# Patient Record
Sex: FEMALE | Race: WHITE | NOT HISPANIC OR LATINO | Employment: FULL TIME | URBAN - METROPOLITAN AREA
[De-identification: names, ages, dates, MRNs, and addresses within clinical notes are randomized per-mention and may not be internally consistent; named-entity substitution may affect disease eponyms.]

---

## 2020-08-12 ENCOUNTER — HOSPITAL ENCOUNTER (OUTPATIENT)
Dept: RADIOLOGY | Facility: MEDICAL CENTER | Age: 45
End: 2020-08-12
Attending: OBSTETRICS & GYNECOLOGY
Payer: COMMERCIAL

## 2020-08-12 DIAGNOSIS — Z12.31 VISIT FOR SCREENING MAMMOGRAM: ICD-10-CM

## 2020-08-12 PROCEDURE — 77067 SCR MAMMO BI INCL CAD: CPT

## 2022-04-06 ENCOUNTER — HOSPITAL ENCOUNTER (OUTPATIENT)
Dept: RADIOLOGY | Facility: MEDICAL CENTER | Age: 47
End: 2022-04-06
Attending: OBSTETRICS & GYNECOLOGY
Payer: COMMERCIAL

## 2022-04-06 DIAGNOSIS — Z12.31 VISIT FOR SCREENING MAMMOGRAM: ICD-10-CM

## 2022-04-06 PROCEDURE — 77063 BREAST TOMOSYNTHESIS BI: CPT

## 2024-03-06 ENCOUNTER — OFFICE VISIT (OUTPATIENT)
Dept: URGENT CARE | Facility: CLINIC | Age: 49
End: 2024-03-06
Payer: COMMERCIAL

## 2024-03-06 ENCOUNTER — APPOINTMENT (OUTPATIENT)
Dept: RADIOLOGY | Facility: IMAGING CENTER | Age: 49
End: 2024-03-06
Payer: COMMERCIAL

## 2024-03-06 VITALS
HEIGHT: 66 IN | DIASTOLIC BLOOD PRESSURE: 80 MMHG | TEMPERATURE: 98.3 F | HEART RATE: 64 BPM | WEIGHT: 180 LBS | BODY MASS INDEX: 28.93 KG/M2 | RESPIRATION RATE: 20 BRPM | OXYGEN SATURATION: 94 % | SYSTOLIC BLOOD PRESSURE: 126 MMHG

## 2024-03-06 DIAGNOSIS — S67.10XA CRUSHING INJURY OF FINGER, INITIAL ENCOUNTER: ICD-10-CM

## 2024-03-06 PROCEDURE — 99203 OFFICE O/P NEW LOW 30 MIN: CPT

## 2024-03-06 PROCEDURE — 73140 X-RAY EXAM OF FINGER(S): CPT | Mod: TC,FY,RT | Performed by: RADIOLOGY

## 2024-03-06 PROCEDURE — 3079F DIAST BP 80-89 MM HG: CPT

## 2024-03-06 PROCEDURE — 3074F SYST BP LT 130 MM HG: CPT

## 2024-03-06 NOTE — PROGRESS NOTES
CHIEF COMPLAINT  Chief Complaint   Patient presents with    Finger Injury     RED AND PURPLE today      Subjective:   Myrna Ritter is a 49 y.o. female who presents to urgent care with complaints of injury to her right ring finger.  Patient reports that she was carrying a very heavy vase and attempted to catch it with it fell.  She reports that her finger was smashed between the bottom of the vase and the floor.  She reports significant swelling and pain is progressively worsened over the course the day.  She denies any numbness or tingling.  She denies any loss sensation.  Patient denies any difficulty moving finger but is concerned about potential fracture.  She denies any other pertinent past medical history.      ROS    PAST MEDICAL HISTORY  Patient Active Problem List    Diagnosis Date Noted    Varicose veins 02/03/2013    Plantar fasciitis of left foot 02/03/2013    Knee pain, bilateral 02/03/2013       SURGICAL HISTORY   has a past surgical history that includes vein stripping.    ALLERGIES  Allergies   Allergen Reactions    Nkda [No Known Drug Allergy]        CURRENT MEDICATIONS  Home Medications       Reviewed by Lyndon Henry'preet (Medical Assistant) on 03/06/24 at 1551  Med List Status: <None>     Medication Last Dose Status   acetaminophen (TYLENOL) 500 MG TABS Taking Active   ibuprofen (MOTRIN) 200 MG TABS Taking Active   Misc Natural Products (GLUCOSAMINE CHOND COMPLEX/MSM) TABS Not Taking Active                    SOCIAL HISTORY  Social History     Tobacco Use    Smoking status: Former    Smokeless tobacco: Never    Tobacco comments:     smoked socially in high school   Substance and Sexual Activity    Alcohol use: Yes    Drug use: Not on file    Sexual activity: Yes     Partners: Male       FAMILY HISTORY  Family History   Problem Relation Age of Onset    Stroke Father          Medications, Allergies, and current problem list reviewed today in Epic.     Objective:     /80    "Pulse 64   Temp 36.8 °C (98.3 °F) (Temporal)   Resp 20   Ht 1.676 m (5' 6\")   Wt 81.6 kg (180 lb)   SpO2 94%     Physical Exam  Vitals reviewed.   Constitutional:       General: She is not in acute distress.     Appearance: Normal appearance. She is not ill-appearing or toxic-appearing.   HENT:      Head: Normocephalic.      Right Ear: Tympanic membrane normal.   Cardiovascular:      Rate and Rhythm: Normal rate and regular rhythm.      Pulses: Normal pulses.      Heart sounds: Normal heart sounds.   Pulmonary:      Effort: Pulmonary effort is normal. No respiratory distress.      Breath sounds: Normal breath sounds.   Musculoskeletal:      Right hand: Swelling and tenderness present. No deformity, lacerations or bony tenderness. Normal range of motion. Normal strength. Normal sensation. There is no disruption of two-point discrimination. Normal capillary refill. Normal pulse.      Cervical back: Normal range of motion. No rigidity or tenderness.      Comments: Significant swelling and bruising to right ring finger.  No signs of subungual hematoma.  Nail is intact.     Lymphadenopathy:      Cervical: No cervical adenopathy.   Skin:     General: Skin is warm.      Capillary Refill: Capillary refill takes less than 2 seconds.   Neurological:      General: No focal deficit present.      Mental Status: She is alert.   Psychiatric:         Mood and Affect: Mood normal.         RADIOLOGY RESULTS   DX-FINGER(S) 2+ RIGHT    Result Date: 3/6/2024  3/6/2024 4:05 PM HISTORY/REASON FOR EXAM:  Pain/Deformity Following Trauma TECHNIQUE/EXAM DESCRIPTION AND NUMBER OF VIEWS: 3 of the right fingers COMPARISON: None. FINDINGS: There is normal bony mineralization.  There is no evidence of fracture, dislocation, or osseous lesion.  There is no evidence of soft tissue injury.     1.  No radiographic evidence of acute injury.          Assessment/Plan:     Diagnosis and associated orders:     1. Crushing injury of finger, initial " encounter  DX-FINGER(S) 2+ RIGHT         Comments/MDM:     Upon physical exam patient is alert no apparent signs of distress.  Vital signs are stable in clinic.  X-ray reveals no evidence of fracture or dislocation to finger.  Discussed x-ray findings with patient today in clinic.  Advised patient to take Tylenol Motrin for alleviation discomfort.  Ice as needed.  Red flag signs and symptoms discussed.  Instructed to return to ER or urgent care if symptoms worsen or fail to improve.         Differential diagnosis, natural history, supportive care, and indications for immediate follow-up discussed.    Advised the patient to follow-up with the primary care physician for recheck, reevaluation, and consideration of further management.    Please note that this dictation was created using voice recognition software. I have made a reasonable attempt to correct obvious errors, but I expect that there are errors of grammar and possibly content that I did not discover before finalizing the note.    This note was electronically signed by PAULETTE Osborne

## 2024-03-15 ENCOUNTER — HOSPITAL ENCOUNTER (OUTPATIENT)
Dept: RADIOLOGY | Facility: MEDICAL CENTER | Age: 49
End: 2024-03-15
Attending: OBSTETRICS & GYNECOLOGY
Payer: COMMERCIAL

## 2024-03-15 DIAGNOSIS — Z12.31 VISIT FOR SCREENING MAMMOGRAM: ICD-10-CM

## 2024-03-15 PROCEDURE — 77067 SCR MAMMO BI INCL CAD: CPT

## 2024-03-21 ENCOUNTER — HOSPITAL ENCOUNTER (OUTPATIENT)
Dept: RADIOLOGY | Facility: MEDICAL CENTER | Age: 49
End: 2024-03-21
Attending: STUDENT IN AN ORGANIZED HEALTH CARE EDUCATION/TRAINING PROGRAM
Payer: COMMERCIAL

## 2024-03-21 DIAGNOSIS — M72.2 PLANTAR FASCIITIS OF LEFT FOOT: ICD-10-CM

## 2024-03-21 DIAGNOSIS — R29.898 HEAVY SENSATION OF LOWER EXTREMITY: ICD-10-CM

## 2024-03-21 PROCEDURE — 73650 X-RAY EXAM OF HEEL: CPT | Mod: LT

## 2024-03-21 PROCEDURE — 93922 UPR/L XTREMITY ART 2 LEVELS: CPT

## 2024-11-26 ENCOUNTER — APPOINTMENT (OUTPATIENT)
Dept: RADIOLOGY | Facility: MEDICAL CENTER | Age: 49
End: 2024-11-26
Attending: STUDENT IN AN ORGANIZED HEALTH CARE EDUCATION/TRAINING PROGRAM
Payer: COMMERCIAL

## 2024-11-26 DIAGNOSIS — G89.29 CHRONIC IDIOPATHIC ANAL PAIN: ICD-10-CM

## 2024-11-26 DIAGNOSIS — M25.562 ARTHRALGIA OF LEFT LOWER LEG: ICD-10-CM

## 2024-11-26 DIAGNOSIS — K62.89 CHRONIC IDIOPATHIC ANAL PAIN: ICD-10-CM

## 2024-11-26 DIAGNOSIS — M25.561 ARTHRALGIA OF RIGHT LOWER LEG: ICD-10-CM

## 2024-11-26 PROCEDURE — 73562 X-RAY EXAM OF KNEE 3: CPT | Mod: RT

## 2024-11-26 PROCEDURE — 73562 X-RAY EXAM OF KNEE 3: CPT | Mod: LT

## 2025-03-27 ENCOUNTER — OFFICE VISIT (OUTPATIENT)
Dept: PHYSICAL MEDICINE AND REHAB | Facility: MEDICAL CENTER | Age: 50
End: 2025-03-27
Payer: COMMERCIAL

## 2025-03-27 VITALS
SYSTOLIC BLOOD PRESSURE: 122 MMHG | OXYGEN SATURATION: 97 % | RESPIRATION RATE: 16 BRPM | BODY MASS INDEX: 25.07 KG/M2 | HEART RATE: 84 BPM | TEMPERATURE: 97 F | HEIGHT: 66 IN | DIASTOLIC BLOOD PRESSURE: 82 MMHG | WEIGHT: 156 LBS

## 2025-03-27 DIAGNOSIS — M79.7 FIBROMYALGIA: ICD-10-CM

## 2025-03-27 DIAGNOSIS — G89.29 CHRONIC PAIN OF BOTH KNEES: ICD-10-CM

## 2025-03-27 DIAGNOSIS — M25.50 POLYARTHRALGIA: ICD-10-CM

## 2025-03-27 DIAGNOSIS — M25.561 CHRONIC PAIN OF BOTH KNEES: ICD-10-CM

## 2025-03-27 DIAGNOSIS — M25.562 CHRONIC PAIN OF BOTH KNEES: ICD-10-CM

## 2025-03-27 RX ORDER — DULOXETIN HYDROCHLORIDE 30 MG/1
30 CAPSULE, DELAYED RELEASE ORAL DAILY
Qty: 30 CAPSULE | Refills: 2 | Status: SHIPPED | OUTPATIENT
Start: 2025-03-27

## 2025-03-27 ASSESSMENT — PATIENT HEALTH QUESTIONNAIRE - PHQ9
5. POOR APPETITE OR OVEREATING: 1 - SEVERAL DAYS
SUM OF ALL RESPONSES TO PHQ QUESTIONS 1-9: 14
CLINICAL INTERPRETATION OF PHQ2 SCORE: 3

## 2025-03-27 ASSESSMENT — PAIN SCALES - GENERAL: PAINLEVEL_OUTOF10: 7=MODERATE-SEVERE PAIN

## 2025-03-27 NOTE — PROGRESS NOTES
[facility] (Physical Medicine and Rehabilitation)  Sports Medicine& Interventional Spine   New Patient Visit    Patient Name: Myrna Ritter   Patient : 1975  PCP: Marcia Jaramillo M.D.  MRN: [id number]     Date of service: 25    Referring provider: Marcia Jaramillo M.D.    CHIEF COMPLAINT  Chief Complaint   Patient presents with    New Patient     Bilateral Knee Pain         Myrna Ritter is a 50 y.o. very pleasant female  who presents today with Diagnoses of Chronic pain of both knees, Polyarthralgia, and Fibromyalgia were pertinent to this visit.    Verbal consent was obtained for Daniel copilot: Yes      Medical records review:  I reviewed the note from the referring provider Marcia Jaramillo M.D. including the note dated 3/18/25.    Prior Relevant MSK/Interventional Procedures:   Patient has not attempted prior ortho/joint injections.   Patient has not had prior ortho/joint surgery.           HPI:    History of Present Illness  The patient is a 42-year-old female who presents today for initial evaluation of chronic pain throughout multiple joints. She was previously seen by her primary care provider, concern for fibromyalgia. She was referred to our clinic for further evaluation. Previous lab testing did show positive PETRA. She is currently awaiting a rheumatology referral her predominant pain concern is related to bilateral knee pain. Patient reported to be 6-7 out of 10 in severity. However, she does report widespread chronic pain. This has been present for the past 3 years. She notes burning, sharp pain at the anterior aspect of bilateral knees. She also notes aching pain throughout neck, bilateral shoulders, and arms, posterior buttock region, and bilateral feet. She is currently taking Tylenol and a multivitamin for medication. X-ray of bilateral knees dated 2024 showed no evidence of arthritis or effusion no other acute bony abnormality.    She reports experiencing  widespread chronic pain, which she attributes to a COVID-19 infection in 2021. The pain, described as burning and sharp, is localized to the anterior aspect of her bilateral knees, with additional aching pain throughout her neck, bilateral shoulders and arms, posterior buttock region, and bilateral feet. This pain has progressively worsened over the past 3 years, significantly impacting her daily activities such as gardening and housework. She also experiences stiffness and difficulty carrying objects due to pain in her knees, bones, and joints. Despite normal blood work results, her primary care physician suggested increased sleep, reduced workload, and stress management. She also reports high levels of stress due to her demanding job as a . Prolonged sitting exacerbates her knee pain, often reducing her to tears after only 15 to 30 minutes of driving. She has attempted physical therapy for her foot, knee, and shoulder pain, but without noticeable improvement. Overexertion exacerbates her symptoms, leading to weakness and avoidance of certain movements. She has also sought treatment from an acupuncturist, which she believes has increased her energy levels. She is considering IV vitamin and mineral injections at a wellness clinic to boost her energy levels. She has found some relief from lymphatic massages and compression therapy, but these treatments are costly. She is currently taking Tylenol and a multivitamin for medication.    She has also experienced brain fog and fatigue, prompting her physician to consider a diagnosis of fibromyalgia. Her blood work indicated a possible autoimmune condition, and she has developed rashes under her ears and around her neck, leading her physician to suggest lupus as a potential diagnosis.    Supplemental Information  She has been prescribed herbs for bowel regulation and now has daily bowel movements.    SOCIAL HISTORY  She works as a .    FAMILY  HISTORY  Her mother had arthritis.    MEDICATIONS  Tylenol, multivitamin        ROS:   Fever, Chills, Sweats: Denies  Involuntary Weight Loss: Denies  Bowel/bladder issues: denies   See HPI.   All other systems reviewed and negative.       GOALS OF TREATMENT: Symptom Pain relief. improve function.      Psychological testing for pain as depression and pain commonly coexist and need to both be treated.     Opioid Risk Score: No value filed.      Interpretation of Opioid Risk Score   Score 0-3 = Low risk of abuse. Do UDS at least once per year.  Score 4-7 = Moderate risk of abuse. Do UDS 1-4 times per year.  Score 8+ = High risk of abuse. Refer to specialist.    PHQ      3/27/2025    10:00 AM   Depression Screen (PHQ-2/PHQ-9)   PHQ-2 Total Score 3   PHQ-9 Total Score 14       Interpretation of PHQ-9 Total Score   Score Severity   1-4 No Depression   5-9 Mild Depression   10-14 Moderate Depression   15-19 Moderately Severe Depression   20-27 Severe Depression      PMHx:   History reviewed. No pertinent past medical history.    PSHx:   Past Surgical History:   Procedure Laterality Date    MI BREAST REDUCTION      VEIN STRIPPING         Family history   Family History   Problem Relation Age of Onset    Stroke Father        Medications: reviewed on epic.   Outpatient Medications Marked as Taking for the 3/27/25 encounter (Office Visit) with Matias Dunham D.O.   Medication Sig Dispense Refill    DULoxetine (CYMBALTA) 30 MG Cap DR Particles Take 1 Capsule by mouth every day. 30 Capsule 2    ibuprofen (MOTRIN) 200 MG TABS Take 200 mg by mouth every 6 hours as needed.        acetaminophen (TYLENOL) 500 MG TABS Take 500-1,000 mg by mouth every 6 hours as needed.            Allergies:   Allergies   Allergen Reactions    Nkda [No Known Drug Allergy]        Social Hx:   Social History     Socioeconomic History    Marital status:      Spouse name: Not on file    Number of children: Not on file    Years of education: Not on  "file    Highest education level: Not on file   Occupational History    Not on file   Tobacco Use    Smoking status: Former    Smokeless tobacco: Never    Tobacco comments:     smoked socially in high school   Vaping Use    Vaping status: Never Used   Substance and Sexual Activity    Alcohol use: Yes    Drug use: Not on file    Sexual activity: Yes     Partners: Male   Other Topics Concern    Not on file   Social History Narrative    Not on file     Social Drivers of Health     Financial Resource Strain: Not on file   Food Insecurity: Not on file   Transportation Needs: Not on file   Physical Activity: Not on file   Stress: Not on file (11/3/2024)   Social Connections: Not on file   Intimate Partner Violence: Low Risk  (10/9/2021)    Received from [facility] [employer] [facility]    Intimate Partner Violence     Insults You: Not on file     Threatens You: Not on file     Screams at You: Not on file     Physically Hurt: Not on file     Intimate Partner Violence Score: Not on file   Housing Stability: Not on file         EXAMINATION   Vitals: /82 (BP Location: Right arm, Patient Position: Sitting, BP Cuff Size: Adult)   Pulse 84   Temp 36.1 °C (97 °F) (Temporal)   Resp 16   Ht 1.676 m (5' 6\")   Wt 70.8 kg (156 lb)   SpO2 97%   Physical Exam:     Body Habitus: Body mass index is 25.18 kg/m².  Appearance: Well-groomed, well-nourished, not disheveled  Eyes: No scleral icterus to suggest severe liver disease, no proptosis to suggest severe hyperthyroid  ENT -no obvious auditory deficits, no external lesions, moist mucus membranes   Skin -no rashes or lesions noted. No appreciable skin breakdown on exposed skin areas.    Respiratory-  breathing comfortably on room air, no audible wheezing, full sentences  Cardiovascular- No lower extremity edema noted.   Psychiatric- alert and oriented, calm, comfortable, cooperative     Musculoskeletal and Neuro:  Gait and station - normal gait with reciprocal pattern,  no " presence/use of ambulatory device, no arm assistance with sit-to-stand, nonantalgic. no loss of balance during exam.  No change in patient's demeanor with exam.    Grossly normal cranial nerve exam  Coordination grossly intact      Physical Exam  Diffuse muscular tenderness was noted in the bilateral shoulders, back paraspinal region, posterior buttock region, thighs, and calf region.      Thoracic/Lumbar Spine/Sacral Spine/Hips   Inspection: No evidence of atrophy in bilateral lower extremities throughout     ROM: full  active range of motion with flexion, lateral flexion, and rotation bilaterally.   There is full  active range of motion with lumbar extension with pain.    There is no pain with facet loading maneuver (extension rotation) with axial low back pain on the BILATERAL side(s)    Palpation:   POSITIVE for tenderness to palpation to the para-midline region in the lower lumbar levels.  palpation over SI joint: negative bilaterally  palpation in hip or over the gluteus medius tendon insertion: negative bilaterally       HIP  FAIR test negative bilaterally   Range of motion in the RIGHT hip is full  in flexion, extension, abduction, internal rotation, external rotation.  Range of motion in the LEFT hip is full  in flexion, extension, abduction, internal rotation, external rotation.  Patricia negative bilaterally      SI joint tests  Observation patient sits on one buttocks: Negative  SI joint compression negative bilaterally    SI joint distraction negative bilaterally  Thigh thrust test negative bilaterally   PATRICIA test negative bilaterally  Gaenslen test negative bilaterally    Key points for the international standards for neurological classification of spinal cord injury (ISNCSCI) to light touch.   Dermatome R L   L2 2 2   L3 2 2   L4 2 2   L5 2 2   S1 2 2     Motor Exam Lower Extremities  ? Myotome R L   Hip flexion L2 5 5   Knee extension L3 5 5   Ankle dorsiflexion L4 5 5   Toe extension L5 5 5   Ankle  "plantarflexion S1 5 5   Hip Abduction  5 5   Hip Adduction  5 5     Reflexes  Clonus of the ankle negative bilaterally   ? R L   Patella 2+ 2+   Achilles  2+ 2+   Babinski sign negative bilaterally     Bilateral Knee:   Inspection: no gross deformities appreciated, no edema no ecchymoses, neutral alignment, no valgus/varus deformity, no quad atrophy    Palpation: no effusion. Non-tender to palpation over medial / lateral joint line, patella/patellar facet, patellar / quadriceps tendon, tibial tubercle, posterior fossae, pes anserine bursa, fibular head  ROM: extension to 0 degrees, flexion to 120 degrees; no crepitus   Strength : 5/5 in flexion, extension.   Sensation: intact  Special Tests:   A/P Drawer  negative  Lachman's  negative  Piero's negative  Thessaly negative  Varus stress negative  Valgus stress negative   Patellar grind negative       MEDICAL DECISION MAKING    Medical records review: see under HPI section.     DATA    Labs:   Lab Results   Component Value Date/Time    SODIUM 141 05/11/2016 09:20 AM    POTASSIUM 4.1 05/11/2016 09:20 AM    CHLORIDE 106 05/11/2016 09:20 AM    CO2 27 05/11/2016 09:20 AM    ANION 8.0 05/11/2016 09:20 AM    GLUCOSE 95 12/12/2023 08:54 AM    GLUCOSE 91 05/11/2016 09:20 AM    BUN 17 05/11/2016 09:20 AM    CREATININE 0.86 05/11/2016 09:20 AM    CALCIUM 9.8 05/11/2016 09:20 AM    ASTSGOT 16 05/11/2016 09:20 AM    ALTSGPT 15 05/11/2016 09:20 AM    TBILIRUBIN 0.9 05/11/2016 09:20 AM    ALBUMIN 4.8 05/11/2016 09:20 AM    TOTPROTEIN 7.6 05/11/2016 09:20 AM    GLOBULIN 2.8 05/11/2016 09:20 AM    AGRATIO 1.7 05/11/2016 09:20 AM   ]    No results found for: \"PROTHROMBTM\", \"INR\"     Lab Results   Component Value Date/Time    WBC 9.4 05/11/2016 09:20 AM    RBC 4.82 05/11/2016 09:20 AM    HEMOGLOBIN 14.5 05/11/2016 09:20 AM    HEMATOCRIT 44.1 05/11/2016 09:20 AM    MCV 91.5 05/11/2016 09:20 AM    MCH 30.1 05/11/2016 09:20 AM    MCHC 32.9 (L) 05/11/2016 09:20 AM    MPV 10.1 05/11/2016 " "09:20 AM        Lab Results   Component Value Date/Time    HBA1C 5.2 2022 10:07 AM    HBA1C 5.5 2016 09:20 AM        Imaging:   I personally reviewed following images, these are my reads  Results  Laboratory Studies  Positive PETRA.    Imaging  X-ray of bilateral knees showed no evidence of arthritis or effusion, no other acute bony abnormality.        IMAGING radiology reads. I reviewed the following radiology reads                                                                         Results for orders placed in visit on 24    DX-FINGER(S) 2+ RIGHT    Impression  1.  No radiographic evidence of acute injury.        Results for orders placed during the hospital encounter of 24    DX-OS CALCIS (HEEL) 2+ LEFT    Impression  1.  Spurring at the insertion of the achilles tendon and origin of plantar fascia on the calcaneus    2.  No other finding        Results for orders placed in visit on 24    DX-KNEE 3 VIEWS Other - Please Comment    Impression  1. No acute osseous abnormality.                              ASSESSMENT AND PLAN:  Myrna Ritter   : 1975       Diagnoses and all orders for this visit:  1. Chronic pain of both knees        2. Polyarthralgia        3. Fibromyalgia              Assessment & Plan  Fibromyalgia.  Her symptoms align with a diagnosis of fibromyalgia, characterized by diffuse muscle pain, joint pain, fatigue, and cognitive difficulties commonly referred to as \"brain fog.\" The only abnormal laboratory result was a positive PETRA, which is nonspecific. All other tests were within normal limits. The absence of any joint degradation or arthritis on x-rays further supports this diagnosis. A comprehensive treatment plan was discussed, including the potential benefits of duloxetine in managing her symptoms. The importance of physical activity in long-term pain management was emphasized, and she was encouraged to continue with Pilates. The use of compression " therapy was also recommended. A prescription for duloxetine 30 mg once daily was provided, with instructions to start at a low dose and gradually increase if tolerated. The potential side effects of duloxetine were discussed. A referral to behavioral health for cognitive behavioral therapy was also suggested, will defer for now.     Follow-up  The patient will follow up in 6 weeks.      Orders Placed This Encounter    DULoxetine (CYMBALTA) 30 MG Cap DR Particles       -Medications/Modalities: duloxetine as above, consider increase to 60mg if tolerate at follow up  -Limitations: Activity as tolerated     -Therapy (PT/OT/Aquatherapy):Consider therapy and behavioral health referral at follow up  -Diagnostic workup: reviewed today as above  -Interventional program: not indicated at this time  -Referrals: none required at this time      Follow-up: 6 weeks    Patient expressed understanding of the management plan. Patient (and Family Members) was/were encouraged to call if any worries, issues, problems or concerns prior to the next visit     Please note that this dictation was created using voice recognition software. I have made every reasonable attempt to correct obvious errors but there may be errors of grammar and content that I may have overlooked prior to finalization of this note.    Matias Dunham DO  Physical Medicine and Rehabilitation  Sports Medicine and Spine  [facility]           AMELIA Jaramillo M.D.   Marcia Tejada M.D.

## 2025-05-10 ENCOUNTER — NON-PROVIDER VISIT (OUTPATIENT)
Dept: URGENT CARE | Facility: CLINIC | Age: 50
End: 2025-05-10
Payer: COMMERCIAL

## 2025-05-10 ENCOUNTER — OFFICE VISIT (OUTPATIENT)
Dept: URGENT CARE | Facility: CLINIC | Age: 50
End: 2025-05-10
Payer: COMMERCIAL

## 2025-05-10 VITALS
SYSTOLIC BLOOD PRESSURE: 108 MMHG | RESPIRATION RATE: 15 BRPM | DIASTOLIC BLOOD PRESSURE: 70 MMHG | WEIGHT: 142.2 LBS | TEMPERATURE: 98.2 F | BODY MASS INDEX: 22.85 KG/M2 | HEIGHT: 66 IN | OXYGEN SATURATION: 100 % | HEART RATE: 83 BPM

## 2025-05-10 DIAGNOSIS — S06.0X0A CONCUSSION WITHOUT LOSS OF CONSCIOUSNESS, INITIAL ENCOUNTER: ICD-10-CM

## 2025-05-10 DIAGNOSIS — S50.312A ABRASION OF LEFT ELBOW, INITIAL ENCOUNTER: ICD-10-CM
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
## 2025-05-10 DIAGNOSIS — Z02.1 PRE-EMPLOYMENT DRUG SCREENING: ICD-10-CM

## 2025-05-10 LAB
AMP AMPHETAMINE: NORMAL
BREATH ALCOHOL COMMENT: NORMAL
COC COCAINE: NORMAL
INT CON NEG: NORMAL
INT CON POS: NORMAL
MET METHAMPHETAMINES: NORMAL
OPI OPIATES: NORMAL
PCP PHENCYCLIDINE: NORMAL
POC BREATHALIZER: 0 PERCENT (ref 0–0.01)
POC DRUG COMMENT 753798-OCCUPATIONAL HEALTH: NEGATIVE
THC: NORMAL

## 2025-05-10 PROCEDURE — 80305 DRUG TEST PRSMV DIR OPT OBS: CPT

## 2025-05-10 PROCEDURE — 99214 OFFICE O/P EST MOD 30 MIN: CPT

## 2025-05-10 PROCEDURE — 82075 ASSAY OF BREATH ETHANOL: CPT

## 2025-05-10 NOTE — LETTER
PHYSICIAN’S AND CHIROPRACTIC PHYSICIAN'S   PROGRESS REPORT   CERTIFICATION OF DISABILITY Claim Number:     Social Security Number:    Patient’s Name: Myrna Ritter Date of Injury: 5/10/2025   Employer: [employer] Name of MCO (if applicable):      Patient’s Job Description/Occupation:        Previous Injuries/Diseases/Surgeries Contributing to the Condition:  N/A      Diagnosis: (Y99.0) Work related injury  (S06.0X0A) Concussion without loss of consciousness, initial encounter  (S50.312A) Abrasion of left elbow, initial encounter      Related to the Industrial Injury? Yes     Explain: Happened while at work walking between sites      Objective Medical Findings: Tenderness to palpation of the occiput, no skull depression, hematoma, or laceration  Slight abrasion to the left lateral epicondyle.  Full range of motion without pain         None - Discharged                         Stable  Yes                 Ratable  No        Generally Improved                         Condition Worsened                  Condition Same  May Have Suffered a Permanent Disability No     Treatment Plan:    Cognitive rest   Tylenol, avoid NSAIDs   RICE therapy   TTD   Monitor symptoms            No Change in Therapy                  PT/OT Prescribed                      Medication May be Used While Working        Case Management                          PT/OT Discontinued    Consultation    Further Diagnostic Studies:    Prescription(s)                 Released to FULL DUTY /No Restrictions on (Date):     X  Certified TOTALLY TEMPORARILY DISABLED (Indicate Dates) From: 5/10/2025 To: 5/12/2025    Released to RESTRICTED/Modified Duty on (Date): From:   To:    Restrictions Are:         No Sitting    No Standing    No Pulling Other:         No Bending at Waist     No Stooping     No Lifting        No Carrying     No Walking Lifting Restricted to (lbs.):          No Pushing        No Climbing     No  Reaching Above Shoulders       Date of Next Visit:  5/12/2025     Date of this Exam: 5/10/2025 Physician/Chiropractic Physician Name: PAULETTE Rod Physician/Chiropractic Physician Signature:  Torey Fuchs DO MPH     [place]:  [id number] [facility], [address] [place], [place] [address] - Telephone [phone] [place]:  [address] [place] [address], [address] [place], [place] [address] - Telephone [phone]    [email or web address]  D-39 (Rev. 10/24)
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
## 2025-05-12 ENCOUNTER — OCCUPATIONAL MEDICINE (OUTPATIENT)
Dept: URGENT CARE | Facility: CLINIC | Age: 50
End: 2025-05-12
Payer: COMMERCIAL

## 2025-05-12 ENCOUNTER — APPOINTMENT (OUTPATIENT)
Dept: PHYSICAL MEDICINE AND REHAB | Facility: MEDICAL CENTER | Age: 50
End: 2025-05-12
Payer: COMMERCIAL

## 2025-05-12 VITALS
BODY MASS INDEX: 22.82 KG/M2 | HEIGHT: 66 IN | WEIGHT: 142 LBS | HEART RATE: 87 BPM | SYSTOLIC BLOOD PRESSURE: 96 MMHG | TEMPERATURE: 98.4 F | OXYGEN SATURATION: 97 % | RESPIRATION RATE: 14 BRPM | DIASTOLIC BLOOD PRESSURE: 62 MMHG

## 2025-05-12 VITALS
SYSTOLIC BLOOD PRESSURE: 102 MMHG | HEIGHT: 66 IN | WEIGHT: 142 LBS | TEMPERATURE: 97.7 F | OXYGEN SATURATION: 96 % | HEART RATE: 85 BPM | BODY MASS INDEX: 22.82 KG/M2 | DIASTOLIC BLOOD PRESSURE: 62 MMHG

## 2025-05-12 DIAGNOSIS — S50.312D ABRASION OF LEFT ELBOW, SUBSEQUENT ENCOUNTER: ICD-10-CM

## 2025-05-12 DIAGNOSIS — M25.562 CHRONIC PAIN OF BOTH KNEES: ICD-10-CM

## 2025-05-12 DIAGNOSIS — M79.7 FIBROMYALGIA: ICD-10-CM

## 2025-05-12 DIAGNOSIS — S06.0X0D CONCUSSION WITHOUT LOSS OF CONSCIOUSNESS, SUBSEQUENT ENCOUNTER: ICD-10-CM

## 2025-05-12 DIAGNOSIS — M25.50 POLYARTHRALGIA: ICD-10-CM

## 2025-05-12 DIAGNOSIS — G89.29 CHRONIC PAIN OF BOTH KNEES: ICD-10-CM

## 2025-05-12 DIAGNOSIS — M25.561 CHRONIC PAIN OF BOTH KNEES: ICD-10-CM

## 2025-05-12 PROCEDURE — 3078F DIAST BP <80 MM HG: CPT | Performed by: PHYSICIAN ASSISTANT

## 2025-05-12 PROCEDURE — 99213 OFFICE O/P EST LOW 20 MIN: CPT | Performed by: STUDENT IN AN ORGANIZED HEALTH CARE EDUCATION/TRAINING PROGRAM

## 2025-05-12 PROCEDURE — 3074F SYST BP LT 130 MM HG: CPT | Performed by: PHYSICIAN ASSISTANT

## 2025-05-12 PROCEDURE — 3078F DIAST BP <80 MM HG: CPT | Performed by: STUDENT IN AN ORGANIZED HEALTH CARE EDUCATION/TRAINING PROGRAM

## 2025-05-12 PROCEDURE — 99213 OFFICE O/P EST LOW 20 MIN: CPT | Performed by: PHYSICIAN ASSISTANT

## 2025-05-12 PROCEDURE — 3074F SYST BP LT 130 MM HG: CPT | Performed by: STUDENT IN AN ORGANIZED HEALTH CARE EDUCATION/TRAINING PROGRAM

## 2025-05-12 ASSESSMENT — PATIENT HEALTH QUESTIONNAIRE - PHQ9
5. POOR APPETITE OR OVEREATING: 1 - SEVERAL DAYS
CLINICAL INTERPRETATION OF PHQ2 SCORE: 2
SUM OF ALL RESPONSES TO PHQ QUESTIONS 1-9: 9
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
## 2025-07-07 ENCOUNTER — APPOINTMENT (OUTPATIENT)
Dept: PHYSICAL MEDICINE AND REHAB | Facility: MEDICAL CENTER | Age: 50
End: 2025-07-07
Payer: COMMERCIAL